# Patient Record
Sex: FEMALE | Race: WHITE | NOT HISPANIC OR LATINO | Employment: FULL TIME | URBAN - METROPOLITAN AREA
[De-identification: names, ages, dates, MRNs, and addresses within clinical notes are randomized per-mention and may not be internally consistent; named-entity substitution may affect disease eponyms.]

---

## 2017-01-30 ENCOUNTER — ANESTHESIA (OUTPATIENT)
Dept: SURGERY | Facility: HOSPITAL | Age: 42
End: 2017-01-30
Payer: MEDICAID

## 2017-01-30 ENCOUNTER — ANESTHESIA EVENT (OUTPATIENT)
Dept: SURGERY | Facility: HOSPITAL | Age: 42
End: 2017-01-30
Payer: MEDICAID

## 2017-01-30 ENCOUNTER — HOSPITAL ENCOUNTER (OUTPATIENT)
Facility: HOSPITAL | Age: 42
Discharge: HOME | End: 2017-01-30
Attending: SURGERY | Admitting: SURGERY
Payer: MEDICAID

## 2017-01-30 DIAGNOSIS — K37 APPENDICITIS, UNSPECIFIED APPENDICITIS TYPE: Primary | ICD-10-CM

## 2017-01-30 LAB
ALBUMIN SERPL BCP-MCNC: 4.2 G/DL
ALP SERPL-CCNC: 88 U/L
ALT SERPL W/O P-5'-P-CCNC: 21 U/L
AMYLASE SERPL-CCNC: 62 U/L
ANION GAP SERPL CALC-SCNC: 12 MMOL/L
AST SERPL-CCNC: 16 U/L
BACTERIA #/AREA URNS HPF: ABNORMAL /HPF
BASOPHILS # BLD AUTO: 0.02 K/UL
BASOPHILS NFR BLD: 0.1 %
BILIRUB SERPL-MCNC: 1.2 MG/DL
BILIRUB UR QL STRIP: NEGATIVE
BUN SERPL-MCNC: 12 MG/DL
CALCIUM SERPL-MCNC: 9.5 MG/DL
CHLORIDE SERPL-SCNC: 101 MMOL/L
CLARITY UR: CLEAR
CO2 SERPL-SCNC: 25 MMOL/L
COLOR UR: YELLOW
CREAT SERPL-MCNC: 0.8 MG/DL
DIFFERENTIAL METHOD: ABNORMAL
EOSINOPHIL # BLD AUTO: 0 K/UL
EOSINOPHIL NFR BLD: 0 %
ERYTHROCYTE [DISTWIDTH] IN BLOOD BY AUTOMATED COUNT: 13.5 %
EST. GFR  (AFRICAN AMERICAN): >60 ML/MIN/1.73 M^2
EST. GFR  (NON AFRICAN AMERICAN): >60 ML/MIN/1.73 M^2
GLUCOSE SERPL-MCNC: 137 MG/DL
GLUCOSE UR QL STRIP: NEGATIVE
HCT VFR BLD AUTO: 40.9 %
HGB BLD-MCNC: 13.7 G/DL
HGB UR QL STRIP: ABNORMAL
KETONES UR QL STRIP: NEGATIVE
LACTATE SERPL-SCNC: 1.8 MMOL/L
LEUKOCYTE ESTERASE UR QL STRIP: NEGATIVE
LYMPHOCYTES # BLD AUTO: 0.9 K/UL
LYMPHOCYTES NFR BLD: 4.4 %
MCH RBC QN AUTO: 29.8 PG
MCHC RBC AUTO-ENTMCNC: 33.5 %
MCV RBC AUTO: 89 FL
MICROSCOPIC COMMENT: ABNORMAL
MONOCYTES # BLD AUTO: 0.5 K/UL
MONOCYTES NFR BLD: 2.4 %
NEUTROPHILS # BLD AUTO: 18.8 K/UL
NEUTROPHILS NFR BLD: 93.1 %
NITRITE UR QL STRIP: NEGATIVE
PH UR STRIP: 7 [PH] (ref 5–8)
PLATELET # BLD AUTO: 458 K/UL
PMV BLD AUTO: 9.5 FL
POTASSIUM SERPL-SCNC: 3.8 MMOL/L
PROT SERPL-MCNC: 8.2 G/DL
PROT UR QL STRIP: NEGATIVE
RBC # BLD AUTO: 4.6 M/UL
RBC #/AREA URNS HPF: 7 /HPF (ref 0–4)
SODIUM SERPL-SCNC: 138 MMOL/L
SP GR UR STRIP: 1.01 (ref 1–1.03)
URN SPEC COLLECT METH UR: ABNORMAL
UROBILINOGEN UR STRIP-ACNC: NEGATIVE EU/DL
WBC # BLD AUTO: 20.23 K/UL
WBC #/AREA URNS HPF: 0 /HPF (ref 0–5)

## 2017-01-30 PROCEDURE — 96367 TX/PROPH/DG ADDL SEQ IV INF: CPT

## 2017-01-30 PROCEDURE — 63600175 PHARM REV CODE 636 W HCPCS

## 2017-01-30 PROCEDURE — 25000003 PHARM REV CODE 250: Performed by: NURSE ANESTHETIST, CERTIFIED REGISTERED

## 2017-01-30 PROCEDURE — 80053 COMPREHEN METABOLIC PANEL: CPT

## 2017-01-30 PROCEDURE — 87040 BLOOD CULTURE FOR BACTERIA: CPT

## 2017-01-30 PROCEDURE — 25000003 PHARM REV CODE 250: Performed by: SURGERY

## 2017-01-30 PROCEDURE — 88304 TISSUE EXAM BY PATHOLOGIST: CPT | Performed by: PATHOLOGY

## 2017-01-30 PROCEDURE — 37000008 HC ANESTHESIA 1ST 15 MINUTES: Performed by: SURGERY

## 2017-01-30 PROCEDURE — 96361 HYDRATE IV INFUSION ADD-ON: CPT

## 2017-01-30 PROCEDURE — 63600175 PHARM REV CODE 636 W HCPCS: Performed by: SURGERY

## 2017-01-30 PROCEDURE — 81000 URINALYSIS NONAUTO W/SCOPE: CPT

## 2017-01-30 PROCEDURE — 36000709 HC OR TIME LEV III EA ADD 15 MIN: Performed by: SURGERY

## 2017-01-30 PROCEDURE — 27000496 *HC LARYNGEAL BLADE (STAH ONLY): Performed by: NURSE ANESTHETIST, CERTIFIED REGISTERED

## 2017-01-30 PROCEDURE — 25500020 PHARM REV CODE 255: Performed by: SURGERY

## 2017-01-30 PROCEDURE — 83605 ASSAY OF LACTIC ACID: CPT

## 2017-01-30 PROCEDURE — 96365 THER/PROPH/DIAG IV INF INIT: CPT

## 2017-01-30 PROCEDURE — 99285 EMERGENCY DEPT VISIT HI MDM: CPT | Mod: 25

## 2017-01-30 PROCEDURE — 27000494 *HC OXYGEN SET UP (STAH ONLY): Performed by: NURSE ANESTHETIST, CERTIFIED REGISTERED

## 2017-01-30 PROCEDURE — 27201423 OPTIME MED/SURG SUP & DEVICES STERILE SUPPLY: Performed by: SURGERY

## 2017-01-30 PROCEDURE — 27200120 HC KIT IV START (RUSH ONLY): Performed by: NURSE ANESTHETIST, CERTIFIED REGISTERED

## 2017-01-30 PROCEDURE — 36000708 HC OR TIME LEV III 1ST 15 MIN: Performed by: SURGERY

## 2017-01-30 PROCEDURE — 00840 ANES IPER PX LOWER ABD NOS: CPT | Mod: P2,,QZ | Performed by: NURSE ANESTHETIST, CERTIFIED REGISTERED

## 2017-01-30 PROCEDURE — 36415 COLL VENOUS BLD VENIPUNCTURE: CPT

## 2017-01-30 PROCEDURE — 71000033 HC RECOVERY, INTIAL HOUR: Performed by: SURGERY

## 2017-01-30 PROCEDURE — 27000495 *HC ANESTHESIA START UP SUPPLY KIT (STAH ONLY): Performed by: NURSE ANESTHETIST, CERTIFIED REGISTERED

## 2017-01-30 PROCEDURE — 96375 TX/PRO/DX INJ NEW DRUG ADDON: CPT

## 2017-01-30 PROCEDURE — 85025 COMPLETE CBC W/AUTO DIFF WBC: CPT

## 2017-01-30 PROCEDURE — 37000009 HC ANESTHESIA EA ADD 15 MINS: Performed by: SURGERY

## 2017-01-30 PROCEDURE — 63600175 PHARM REV CODE 636 W HCPCS: Performed by: NURSE ANESTHETIST, CERTIFIED REGISTERED

## 2017-01-30 PROCEDURE — 82150 ASSAY OF AMYLASE: CPT

## 2017-01-30 RX ORDER — SODIUM CHLORIDE, SODIUM LACTATE, POTASSIUM CHLORIDE, CALCIUM CHLORIDE 600; 310; 30; 20 MG/100ML; MG/100ML; MG/100ML; MG/100ML
INJECTION, SOLUTION INTRAVENOUS CONTINUOUS PRN
Status: DISCONTINUED | OUTPATIENT
Start: 2017-01-30 | End: 2017-01-30

## 2017-01-30 RX ORDER — CEFOXITIN SODIUM 2 G/50ML
2 INJECTION, SOLUTION INTRAVENOUS
Status: DISCONTINUED | OUTPATIENT
Start: 2017-01-30 | End: 2017-01-30 | Stop reason: HOSPADM

## 2017-01-30 RX ORDER — SUCCINYLCHOLINE CHLORIDE 20 MG/ML
INJECTION INTRAMUSCULAR; INTRAVENOUS
Status: DISCONTINUED | OUTPATIENT
Start: 2017-01-30 | End: 2017-01-30

## 2017-01-30 RX ORDER — ONDANSETRON HYDROCHLORIDE 2 MG/ML
INJECTION, SOLUTION INTRAMUSCULAR; INTRAVENOUS
Status: DISCONTINUED | OUTPATIENT
Start: 2017-01-30 | End: 2017-01-30

## 2017-01-30 RX ORDER — LIDOCAINE HYDROCHLORIDE 20 MG/ML
INJECTION, SOLUTION EPIDURAL; INFILTRATION; INTRACAUDAL; PERINEURAL
Status: DISCONTINUED | OUTPATIENT
Start: 2017-01-30 | End: 2017-01-30

## 2017-01-30 RX ORDER — BUSPIRONE HYDROCHLORIDE 10 MG/1
10 TABLET ORAL 3 TIMES DAILY
Status: DISCONTINUED | OUTPATIENT
Start: 2017-01-30 | End: 2017-01-30 | Stop reason: HOSPADM

## 2017-01-30 RX ORDER — TRIAMTERENE AND HYDROCHLOROTHIAZIDE 37.5; 25 MG/1; MG/1
1 CAPSULE ORAL EVERY MORNING
Status: DISCONTINUED | OUTPATIENT
Start: 2017-01-30 | End: 2017-01-30 | Stop reason: HOSPADM

## 2017-01-30 RX ORDER — SODIUM CHLORIDE 9 MG/ML
INJECTION, SOLUTION INTRAVENOUS CONTINUOUS
Status: DISCONTINUED | OUTPATIENT
Start: 2017-01-30 | End: 2017-01-30 | Stop reason: HOSPADM

## 2017-01-30 RX ORDER — ROCURONIUM BROMIDE 10 MG/ML
INJECTION, SOLUTION INTRAVENOUS
Status: DISCONTINUED | OUTPATIENT
Start: 2017-01-30 | End: 2017-01-30

## 2017-01-30 RX ORDER — ACETAMINOPHEN 325 MG/1
650 TABLET ORAL EVERY 8 HOURS PRN
Status: DISCONTINUED | OUTPATIENT
Start: 2017-01-30 | End: 2017-01-30

## 2017-01-30 RX ORDER — MIDAZOLAM HYDROCHLORIDE 1 MG/ML
INJECTION INTRAMUSCULAR; INTRAVENOUS
Status: DISCONTINUED | OUTPATIENT
Start: 2017-01-30 | End: 2017-01-30

## 2017-01-30 RX ORDER — HYDROCODONE BITARTRATE AND ACETAMINOPHEN 5; 325 MG/1; MG/1
1 TABLET ORAL EVERY 6 HOURS PRN
Qty: 20 TABLET | Refills: 0 | Status: SHIPPED | OUTPATIENT
Start: 2017-01-30 | End: 2017-10-11

## 2017-01-30 RX ORDER — GLYCOPYRROLATE 0.2 MG/ML
INJECTION INTRAMUSCULAR; INTRAVENOUS
Status: DISCONTINUED | OUTPATIENT
Start: 2017-01-30 | End: 2017-01-30

## 2017-01-30 RX ORDER — PROPOFOL 10 MG/ML
VIAL (ML) INTRAVENOUS
Status: DISCONTINUED | OUTPATIENT
Start: 2017-01-30 | End: 2017-01-30

## 2017-01-30 RX ORDER — FENTANYL CITRATE 50 UG/ML
INJECTION, SOLUTION INTRAMUSCULAR; INTRAVENOUS
Status: DISCONTINUED | OUTPATIENT
Start: 2017-01-30 | End: 2017-01-30

## 2017-01-30 RX ORDER — DEXAMETHASONE SODIUM PHOSPHATE 4 MG/ML
INJECTION, SOLUTION INTRA-ARTICULAR; INTRALESIONAL; INTRAMUSCULAR; INTRAVENOUS; SOFT TISSUE
Status: DISCONTINUED | OUTPATIENT
Start: 2017-01-30 | End: 2017-01-30

## 2017-01-30 RX ORDER — ONDANSETRON 2 MG/ML
4 INJECTION INTRAMUSCULAR; INTRAVENOUS EVERY 8 HOURS PRN
Status: DISCONTINUED | OUTPATIENT
Start: 2017-01-30 | End: 2017-01-30 | Stop reason: HOSPADM

## 2017-01-30 RX ORDER — CEFOXITIN SODIUM 2 G/50ML
2 INJECTION, SOLUTION INTRAVENOUS
Status: DISCONTINUED | OUTPATIENT
Start: 2017-01-30 | End: 2017-01-30

## 2017-01-30 RX ORDER — MORPHINE SULFATE 4 MG/ML
4 INJECTION, SOLUTION INTRAMUSCULAR; INTRAVENOUS EVERY 4 HOURS PRN
Status: DISCONTINUED | OUTPATIENT
Start: 2017-01-30 | End: 2017-01-30 | Stop reason: HOSPADM

## 2017-01-30 RX ORDER — ACETAMINOPHEN 325 MG/1
650 TABLET ORAL EVERY 6 HOURS PRN
Status: DISCONTINUED | OUTPATIENT
Start: 2017-01-30 | End: 2017-01-30 | Stop reason: HOSPADM

## 2017-01-30 RX ORDER — HYDROCODONE BITARTRATE AND ACETAMINOPHEN 5; 325 MG/1; MG/1
1 TABLET ORAL EVERY 4 HOURS PRN
Status: DISCONTINUED | OUTPATIENT
Start: 2017-01-30 | End: 2017-01-30 | Stop reason: HOSPADM

## 2017-01-30 RX ORDER — KETOROLAC TROMETHAMINE 30 MG/ML
INJECTION, SOLUTION INTRAMUSCULAR; INTRAVENOUS
Status: DISCONTINUED | OUTPATIENT
Start: 2017-01-30 | End: 2017-01-30

## 2017-01-30 RX ORDER — HYDROMORPHONE HYDROCHLORIDE 2 MG/ML
2 INJECTION, SOLUTION INTRAMUSCULAR; INTRAVENOUS; SUBCUTANEOUS EVERY 6 HOURS PRN
Status: DISCONTINUED | OUTPATIENT
Start: 2017-01-30 | End: 2017-01-30 | Stop reason: ALTCHOICE

## 2017-01-30 RX ORDER — BUPIVACAINE HYDROCHLORIDE 2.5 MG/ML
INJECTION, SOLUTION INFILTRATION; PERINEURAL
Status: DISCONTINUED | OUTPATIENT
Start: 2017-01-30 | End: 2017-01-30 | Stop reason: HOSPADM

## 2017-01-30 RX ORDER — HYDROMORPHONE HYDROCHLORIDE 1 MG/ML
1 INJECTION, SOLUTION INTRAMUSCULAR; INTRAVENOUS; SUBCUTANEOUS
Status: COMPLETED | OUTPATIENT
Start: 2017-01-30 | End: 2017-01-30

## 2017-01-30 RX ORDER — SODIUM CHLORIDE 9 MG/ML
1000 INJECTION, SOLUTION INTRAVENOUS
Status: COMPLETED | OUTPATIENT
Start: 2017-01-30 | End: 2017-01-30

## 2017-01-30 RX ORDER — HYDROMORPHONE HYDROCHLORIDE 1 MG/ML
1 INJECTION, SOLUTION INTRAMUSCULAR; INTRAVENOUS; SUBCUTANEOUS EVERY 4 HOURS PRN
Status: DISCONTINUED | OUTPATIENT
Start: 2017-01-30 | End: 2017-01-30 | Stop reason: SDUPTHER

## 2017-01-30 RX ORDER — PANTOPRAZOLE SODIUM 40 MG/1
40 TABLET, DELAYED RELEASE ORAL DAILY
Status: DISCONTINUED | OUTPATIENT
Start: 2017-01-30 | End: 2017-01-30 | Stop reason: HOSPADM

## 2017-01-30 RX ORDER — AMLODIPINE BESYLATE 5 MG/1
5 TABLET ORAL DAILY
Status: DISCONTINUED | OUTPATIENT
Start: 2017-01-30 | End: 2017-01-30 | Stop reason: HOSPADM

## 2017-01-30 RX ADMIN — IOHEXOL 100 ML: 350 INJECTION, SOLUTION INTRAVENOUS at 11:01

## 2017-01-30 RX ADMIN — SODIUM CHLORIDE 1000 ML: 0.9 INJECTION, SOLUTION INTRAVENOUS at 09:01

## 2017-01-30 RX ADMIN — SUCCINYLCHOLINE CHLORIDE 120 MG: 20 INJECTION, SOLUTION INTRAMUSCULAR; INTRAVENOUS at 03:01

## 2017-01-30 RX ADMIN — HYDROMORPHONE HYDROCHLORIDE 1 MG: 1 INJECTION, SOLUTION INTRAMUSCULAR; INTRAVENOUS; SUBCUTANEOUS at 12:01

## 2017-01-30 RX ADMIN — MIDAZOLAM HYDROCHLORIDE 2 MG: 1 INJECTION, SOLUTION INTRAMUSCULAR; INTRAVENOUS at 03:01

## 2017-01-30 RX ADMIN — KETOROLAC TROMETHAMINE 30 MG: 30 INJECTION, SOLUTION INTRAMUSCULAR; INTRAVENOUS at 04:01

## 2017-01-30 RX ADMIN — HYDROCODONE BITARTRATE AND ACETAMINOPHEN 1 TABLET: 5; 325 TABLET ORAL at 06:01

## 2017-01-30 RX ADMIN — SODIUM CHLORIDE, SODIUM LACTATE, POTASSIUM CHLORIDE, AND CALCIUM CHLORIDE: .6; .31; .03; .02 INJECTION, SOLUTION INTRAVENOUS at 03:01

## 2017-01-30 RX ADMIN — ROCURONIUM BROMIDE 25 MG: 10 INJECTION, SOLUTION INTRAVENOUS at 03:01

## 2017-01-30 RX ADMIN — GLYCOPYRROLATE 0.4 MG: 0.2 INJECTION INTRAMUSCULAR; INTRAVENOUS at 04:01

## 2017-01-30 RX ADMIN — CEFOXITIN SODIUM 2 G: 2 INJECTION, SOLUTION INTRAVENOUS at 11:01

## 2017-01-30 RX ADMIN — DEXAMETHASONE SODIUM PHOSPHATE 8 MG: 4 INJECTION, SOLUTION INTRAMUSCULAR; INTRAVENOUS at 03:01

## 2017-01-30 RX ADMIN — IOHEXOL 30 ML: 350 INJECTION, SOLUTION INTRAVENOUS at 09:01

## 2017-01-30 RX ADMIN — ROCURONIUM BROMIDE 5 MG: 10 INJECTION, SOLUTION INTRAVENOUS at 03:01

## 2017-01-30 RX ADMIN — FENTANYL CITRATE 150 MCG: 50 INJECTION, SOLUTION INTRAMUSCULAR; INTRAVENOUS at 03:01

## 2017-01-30 RX ADMIN — ONDANSETRON 4 MG: 2 INJECTION, SOLUTION INTRAMUSCULAR; INTRAVENOUS at 04:01

## 2017-01-30 RX ADMIN — HYDROMORPHONE HYDROCHLORIDE 1 MG: 1 INJECTION, SOLUTION INTRAMUSCULAR; INTRAVENOUS; SUBCUTANEOUS at 09:01

## 2017-01-30 RX ADMIN — LIDOCAINE HYDROCHLORIDE 60 MG: 20 INJECTION, SOLUTION EPIDURAL; INFILTRATION; INTRACAUDAL; PERINEURAL at 03:01

## 2017-01-30 RX ADMIN — PROMETHAZINE HYDROCHLORIDE 12.5 MG: 25 INJECTION INTRAMUSCULAR; INTRAVENOUS at 09:01

## 2017-01-30 RX ADMIN — PROPOFOL 180 MG: 10 INJECTION, EMULSION INTRAVENOUS at 03:01

## 2017-01-30 RX ADMIN — FENTANYL CITRATE 50 MCG: 50 INJECTION, SOLUTION INTRAMUSCULAR; INTRAVENOUS at 04:01

## 2017-01-30 RX ADMIN — SODIUM CHLORIDE: 0.9 INJECTION, SOLUTION INTRAVENOUS at 12:01

## 2017-01-30 NOTE — BRIEF OP NOTE
[facility]  Brief Operative Note     SUMMARY     Surgery Date: 1/30/2017     Surgeon(s) and Role:     * Gilmer Hancock MD - Primary    Assisting Surgeon: None    Pre-op Diagnosis:  Acute appendicitis [K35.80]    Post-op Diagnosis:  Post-Op Diagnosis Codes:     * Acute appendicitis [K35.80]    Procedure(s) (LRB):  APPENDECTOMY-LAPAROSCOPIC (N/A)    Anesthesia: General    Description of the findings of the procedure:     Findings/Key Components:     Estimated Blood Loss: 30 mL         Specimens:   Specimen     None          Discharge Note    SUMMARY     Admit Date: 1/30/2017    Discharge Date and Time: 1/30/2017 4:32 PM    Hospital Course (synopsis of major diagnoses, care, treatment, and services provided during the course of the hospital stay):      Final Diagnosis: Post-Op Diagnosis Codes:     * Acute appendicitis [K35.80]    Disposition: Home or Self Care    Follow Up/Patient Instructions:     Medications:  Reconciled Home Medications:   Current Discharge Medication List      CONTINUE these medications which have NOT CHANGED    Details   alprazolam (XANAX) 0.5 MG tablet Take 1 tablet (0.5 mg total) by mouth 3 (three) times daily as needed for Anxiety.  Qty: 90 tablet, Refills: 2      amlodipine (NORVASC) 5 MG tablet Take 1 tablet (5 mg total) by mouth once daily.  Qty: 30 tablet, Refills: 3    Associated Diagnoses: Essential hypertension      busPIRone (BUSPAR) 10 MG tablet Take 1 tablet (10 mg total) by mouth 3 (three) times daily.  Qty: 90 tablet, Refills: 3    Associated Diagnoses: Anxiety      estrogens, conjugated, (PREMARIN) 0.625 MG tablet Take 1 tablet (0.625 mg total) by mouth once daily.  Qty: 30 tablet, Refills: 11      triamterene-hydrochlorothiazide 37.5-25 mg (DYAZIDE) 37.5-25 mg per capsule Take 1 capsule by mouth every morning.  Qty: 30 capsule, Refills: 11           No discharge procedures on file.

## 2017-01-30 NOTE — ED PROVIDER NOTES
[facility]                                        2017                     Chief Complaint  42 y.o. female with Abdominal Pain    History of Present Illness  Riddhi Frederick presents to the emergency room with RLQ pain for 8 hours  Patient has had sharp right left lower quadrant pain, worse this morning  Patient states the pain is severe, could not eat, 10/10 intensity noted  Patient denies any nausea vomiting or diarrhea, no fever or weight loss  Has a past surgical history significant for CBD stones/cholecystectomy  Patient on CT he has an uncomplicated appendicitis without perforation  General surgery Brayan and Santi immediately called for consultation    The history is provided by the patient    Past Medical History   -- Anxiety    -- Hypertension    -- Obesity, Class II, BMI 35-39.9    -- Panic attacks    -- Uterine leiomyoma      Past Surgical History   --  section     -- Knee cartilage surgery     -- Tubal ligation     -- Cholecystectomy     -- Ercp     -- Hysterectomy        ALLERGIES: ACE inhibitor     Review of Systems and Physical Exam     Review of Systems  -- Constitution - no fever, denies fatigue, no weakness, no chills  -- Eyes - no tearing or redness, no visual disturbance  -- Ear, Nose - no tinnitus or earache, no nasal congestion or discharge  -- Mouth,Throat - no sore throat, no toothache, normal voice, normal swallowing  -- Respiratory - denies cough and congestion, no shortness of breath, no CALDERON  -- Cardiovascular - denies chest pain, no palpitations, denies claudication  -- Gastrointestinal - RLQ abdominal pain, no nausea, vomiting, or diarrhea  -- Genitourinary - no dysuria, no denies flank pain, no hematuria or frequency   -- Musculoskeletal - denies back pain, negative for myalgias and arthralgias   -- Neurological - no headache, denies weakness or seizure; no LOC  -- Skin - denies pallor, rash, or changes in skin. no hives or welts noted    Vital  Signs  -- Her blood pressure is 142/86 (abnormal) and her pulse is 67.   -- Her respiration is 20 and oxygen saturation is 99%.      Physical Exam  -- Nursing note and vitals reviewed  -- Constitutional: Appears well-developed and well-nourished  -- Head: Atraumatic. Normocephalic. No obvious abnormality  -- Eyes: Pupils are equal and reactive to light. Normal conjunctiva and lids  -- Neck: Normal range of motion. Neck supple. No masses, trachea midline  -- Cardiac: Normal rate, regular rhythm and normal heart sounds  -- Pulmonary: Normal respiratory effort, breath sounds clear to auscultation  -- Abdominal: RLQ tenderness. Normal bowel sounds. Normal liver edge  -- Musculoskeletal: Normal range of motion, no effusions. Joints stable     Emergency Room Course     Treatment and Evaluation  -- CT abdomen and pelvis shows acute appendicitis  -- White blood cell count is 20,000 white count  -- The electrolytes drawn in the ER today were within normal limits  -- Blood cultures have also been drawn, results are pending   -- Lactic Acid drawn in the ER today was normal    Radiology    Medications Given  -- ceFOXItin 2 g/50ml Dextrose IVPB (not administered)   -- 0.9%  NaCl infusion (1,000 mLs Intravenous New Bag 1/30/17 0943)   -- hydromorphone (PF) injection 1 mg (1 mg Intravenous Given 1/30/17 0945)   -- promethazine (PHENERGAN) 12.5 mg in dextrose 5 % 50 mL IVPB    -- omnipaque 350 iohexol 30 mL (30 mLs Oral Given 1/30/17 0930)   -- omnipaque 350 iohexol 100 mL (100 mLs Intravenous Given 1/30/17 1105)     Diagnosis  -- The encounter diagnosis was Appendicitis, unspecified appendicitis type.    Disposition and Plan  -- Disposition: observation  -- Condition: stable  -- Telemetry monitoring  -- Morning labs  -- SCD hoses  -- Home medications  -- Nausea medication when necessary  -- Pain medication when necessary  -- NPO  -- Intravenous fluids  -- Routine monitoring  -- Bed rest until otherwise stated  -- IV  antibiotics  -- Blood cultures pending    This note is dictated on Dragon Natural Speaking word recognition program.  There are word recognition mistakes that are occasionally missed on review.           Alcides Sparks MD  01/30/17 5528

## 2017-01-30 NOTE — IP AVS SNAPSHOT
[address] [address]  Phone: [phone]           Patient Discharge Instructions     Our goal is to set you up for success. This packet includes information on your condition, medications, and your home care. It will help you to care for yourself so you don't get sicker and need to go back to the hospital.     Please ask your nurse if you have any questions.        There are many details to remember when preparing to leave the hospital. Here is what you will need to do:    1. Take your medicine. If you are prescribed medications, review your Medication List in the following pages. You may have new medications to  at the pharmacy and others that you'll need to stop taking. Review the instructions for how and when to take your medications. Talk with your doctor or nurses if you are unsure of what to do.     2. Go to your follow-up appointments. Specific follow-up information is listed in the following pages. Your may be contacted by a transition nurse or clinical provider about future appointments. Be sure we have all of the phone numbers to reach you, if needed. Please contact your provider's office if you are unable to make an appointment.     3. Watch for warning signs. Your doctor or nurse will give you detailed warning signs to watch for and when to call for assistance. These instructions may also include educational information about your condition. If you experience any of warning signs to your health, call your doctor.               ** Verify the list of medication(s) below is accurate and up to date. Carry this with you in case of emergency. If your medications have changed, please notify your healthcare provider.             Medication List      START taking these medications        Additional Info                      hydrocodone-acetaminophen 5-325mg 5-325 mg per tablet   Commonly known as:  NORCO   Quantity:  20 tablet   Refills:  0   Dose:  1  tablet    Instructions:  Take 1 tablet by mouth every 6 (six) hours as needed for Pain.     Begin Date    AM    Noon    PM    Bedtime         CONTINUE taking these medications        Additional Info                      alprazolam 0.5 MG tablet   Commonly known as:  XANAX   Quantity:  90 tablet   Refills:  2   Dose:  0.5 mg    Instructions:  Take 1 tablet (0.5 mg total) by mouth 3 (three) times daily as needed for Anxiety.     Begin Date    AM    Noon    PM    Bedtime       amlodipine 5 MG tablet   Commonly known as:  NORVASC   Quantity:  30 tablet   Refills:  3   Dose:  5 mg    Instructions:  Take 1 tablet (5 mg total) by mouth once daily.     Begin Date    AM    Noon    PM    Bedtime       busPIRone 10 MG tablet   Commonly known as:  BUSPAR   Quantity:  90 tablet   Refills:  3   Dose:  10 mg    Instructions:  Take 1 tablet (10 mg total) by mouth 3 (three) times daily.     Begin Date    AM    Noon    PM    Bedtime       estrogens (conjugated) 0.625 MG tablet   Commonly known as:  PREMARIN   Quantity:  30 tablet   Refills:  11   Dose:  0.625 mg    Instructions:  Take 1 tablet (0.625 mg total) by mouth once daily.     Begin Date    AM    Noon    PM    Bedtime       triamterene-hydrochlorothiazide 37.5-25 mg 37.5-25 mg per capsule   Commonly known as:  DYAZIDE   Quantity:  30 capsule   Refills:  11   Dose:  1 capsule    Instructions:  Take 1 capsule by mouth every morning.     Begin Date    AM    Noon    PM    Bedtime            Where to Get Your Medications      You can get these medications from any pharmacy     Bring a paper prescription for each of these medications     hydrocodone-acetaminophen 5-325mg 5-325 mg per tablet                  Please bring to all follow up appointments:    1. A copy of your discharge instructions.  2. All medicines you are currently taking in their original bottles.  3. Identification and insurance card.    Please arrive 15 minutes ahead of scheduled appointment time.    Please call  24 hours in advance if you must reschedule your appointment and/or time.        Your Scheduled Appointments     Mar 30, 2017  1:00 PM CDT   Established Patient Visit with MD LLUVIA Beltrán Jr. - [facility] ([facility]    [address] [place] [address]  Tomas TEN[place]IO [id number] [phone]              Follow-up Information     Follow up with Gilmer Hancock MD In 1 week.    Specialty:  General Surgery    Why:  call tomorrow for appt next week     Contact information:    [address] [facility] [address] [phone]          Discharge Instructions     Future Orders    Activity as tolerated     Call MD for:  difficulty breathing, headache or visual disturbances     Call MD for:  persistent nausea and vomiting     Call MD for:  redness, tenderness, or signs of infection (pain, swelling, redness, odor or green/yellow discharge around incision site)     Call MD for:  severe uncontrolled pain     Call MD for:  temperature >100.4     Diet general     Questions:    Total calories:      Fat restriction, if any:      Protein restriction, if any:      Na restriction, if any:      Fluid restriction:      Additional restrictions:      Shower on day dressing removed (No bath)         Discharge Instructions         Discharge Instructions for Laparoscopic Appendectomy (Appendix Removal)  You have had a procedure known as laparoscopic appendectomy to remove your appendix. The appendix is a worm-shaped hollow pouch attached to your large intestine. During your procedure, the doctor made two to four small incisions. One was near your bellybutton, and the others were elsewhere on your abdomen. Through one incision, the doctor inserted a thin tube with a camera attached (called a laparoscope). Surgical tools were inserted in the other incisions.  While you recover you may have discomfort in your shoulder and chest for up to 48 hours after surgery. This is common. It is caused by carbon dioxide (gas) used  during the operation. It will go away.   Activity  · Resume light activities around your home as soon as possible.  · Dont lift anything heavier than 10 pounds until your doctor says its OK.  · Limit sports and strenuous activities for 1 or 2 weeks.  · Shower as usual:  ¨ Gently wash around your incisions with soap and water.  ¨ Dont bathe or soak in a tub until your incisions are well healed.  · Wear loose-fitting clothes. This will help you be more comfortable and cause less irritation around your incisions.  · Dont drive until you are no longer taking prescription pain medication.  Diet  · Eat a bland, low-fat diet, such as the following:  ¨ Well-cooked soft cereals  ¨ Mashed potatoes  ¨ Plain toast or bread, crackers  ¨ Plain spaghetti  ¨ Rice  ¨ Macaroni (plain or with cheese)  ¨ Cottage cheese  ¨ Puddings  ¨ Low-fat yogurt  ¨ Low-fat milk  ¨ Ripe bananas  · Drink 6 to 8 glasses of water a day, unless directed otherwise.  · If you are constipated, take a fiber laxative such as Metamucil.  When to Call Your Doctor  Call your doctor immediately if you have any of the following:  · Swelling, oozing, worsening pain, or unusual redness around the incision  · Fever of 101.5°F (38.5°C) or higher  · Increasing abdominal pain  · Severe diarrhea, bloating, or constipation  · Nausea or vomiting  © 4258-3234 [employer]. [address] [place] [address]. All rights reserved. This information is not intended as a substitute for professional medical care. Always follow your healthcare professional's instructions.            Primary Diagnosis     Your primary diagnosis was:  Appendicitis      Admission Information     Date & Time Provider Department CSN    1/30/2017  7:59 AM Gilmer Hancock MD [facility] [id number]      Care Providers     Provider Role Specialty Primary office phone    Gilmer Hancock MD Attending Provider General Surgery [phone]    Gilmer Hancock MD Surgeon   "General Surgery [phone]      Your Vitals Were     BP Pulse Temp Resp Height Weight    136/73 (BP Location: Left arm, Patient Position: Lying, BP Method: Automatic) 84 98.2 °F (36.8 °C) (Oral) 16 5' 5" (1.651 m) 89.4 kg (197 lb)    Last Period SpO2 BMI          07/26/2016 98% 32.78 kg/m2        Recent Lab Values        4/18/2016                          12:02 PM           A1C 5.3                       Pending Labs     Order Current Status    Specimen to Pathology - Surgery Collected (01/30/17 3704)    Blood culture In process    Blood culture In process      Allergies as of 1/30/2017        Reactions    Ace Inhibitors Other (See Comments)    Blood pressure dropped too low      [facility]     [facility] Nurse Care Line - 24/7 Assistance  Unless otherwise directed by your provider, please contact [facility], our nurse care line that is available for 24/7 assistance.     Registered nurses in the [facility] provide clinical advisement, health education, appointment booking, and other advisory services.  Call for this free service at [phone].        Advance Directives     An advance directive is a document which, in the event you are no longer able to make decisions for yourself, tells your healthcare team what kind of treatment you do or do not want to receive, or who you would like to make those decisions for you.  If you do not currently have an advance directive, [facility] encourages you to create one.  For more information call:  [phone] ([phone]), [phone] ([phone]),  or log on to [email or web address].        Smoking Cessation     If you would like to quit smoking:   You may be eligible for free services if you are a [place] resident and started smoking cigarettes before September 1, 1988.  Call the Smoking Cessation [employer] (SCT) toll free at (758) 573-6581 or (719) 772-7673.   Call 0-188-QUIT-NOW if you do not meet the above criteria.          "   Language Assistance Services     ATTENTION: Language assistance services are available, free of charge. Please call [phone].      ATENCIÓN: Si habla walt, tiene a king disposición servicios gratuitos de asistencia lingüística. Llame al [phone].     CHÚ Ý: N?u b?n nói Ti?ng Vi?t, có các d?ch v? h? tr? ngôn ng? mi?n phí dành cho b?n. G?i s? [phone].         [facility] complies with applicable Federal civil rights laws and does not discriminate on the basis of race, color, national origin, age, disability, or sex.

## 2017-01-30 NOTE — DISCHARGE INSTRUCTIONS
Discharge Instructions for Laparoscopic Appendectomy (Appendix Removal)  You have had a procedure known as laparoscopic appendectomy to remove your appendix. The appendix is a worm-shaped hollow pouch attached to your large intestine. During your procedure, the doctor made two to four small incisions. One was near your bellybutton, and the others were elsewhere on your abdomen. Through one incision, the doctor inserted a thin tube with a camera attached (called a laparoscope). Surgical tools were inserted in the other incisions.  While you recover you may have discomfort in your shoulder and chest for up to 48 hours after surgery. This is common. It is caused by carbon dioxide (gas) used during the operation. It will go away.   Activity  · Resume light activities around your home as soon as possible.  · Dont lift anything heavier than 10 pounds until your doctor says its OK.  · Limit sports and strenuous activities for 1 or 2 weeks.  · Shower as usual:  ¨ Gently wash around your incisions with soap and water.  ¨ Dont bathe or soak in a tub until your incisions are well healed.  · Wear loose-fitting clothes. This will help you be more comfortable and cause less irritation around your incisions.  · Dont drive until you are no longer taking prescription pain medication.  Diet  · Eat a bland, low-fat diet, such as the following:  ¨ Well-cooked soft cereals  ¨ Mashed potatoes  ¨ Plain toast or bread, crackers  ¨ Plain spaghetti  ¨ Rice  ¨ Macaroni (plain or with cheese)  ¨ Cottage cheese  ¨ Puddings  ¨ Low-fat yogurt  ¨ Low-fat milk  ¨ Ripe bananas  · Drink 6 to 8 glasses of water a day, unless directed otherwise.  · If you are constipated, take a fiber laxative such as Metamucil.  When to Call Your Doctor  Call your doctor immediately if you have any of the following:  · Swelling, oozing, worsening pain, or unusual redness around the incision  · Fever of 101.5°F (38.5°C) or higher  · Increasing abdominal  pain  · Severe diarrhea, bloating, or constipation  · Nausea or vomiting  © 8493-5876 [employer]. [address] [place] [address], [place] [address]. All rights reserved. This information is not intended as a substitute for professional medical care. Always follow your healthcare professional's instructions.

## 2017-01-30 NOTE — ED TRIAGE NOTES
Patient presents to the ER with RLQ pain which started yesterday.  Patient reports symptoms worsening during the night.

## 2017-01-30 NOTE — TRANSFER OF CARE
"Anesthesia Transfer of Care Note    Patient: Riddhi Frederick    Procedure(s) Performed: Procedure(s) (LRB):  APPENDECTOMY-LAPAROSCOPIC (N/A)    Patient location: PACU    Anesthesia Type: general    Transport from OR: Transported from OR on room air with adequate spontaneous ventilation    Post pain: adequate analgesia    Post assessment: no apparent anesthetic complications and tolerated procedure well    Post vital signs: stable    Level of consciousness: awake, alert and oriented    Nausea/Vomiting: no nausea/vomiting    Complications: none          Last vitals:   Visit Vitals    BP (!) 164/91 (BP Location: Left arm, Patient Position: Lying, BP Method: Automatic)    Pulse 77    Temp 36.2 °C (97.1 °F) (Oral)    Resp 18    Ht 5' 5" (1.651 m)    Wt 89.4 kg (197 lb)    LMP 07/26/2016    SpO2 100%    Breastfeeding No    BMI 32.78 kg/m2     "

## 2017-01-30 NOTE — ANESTHESIA POSTPROCEDURE EVALUATION
"Anesthesia Post Evaluation    Patient: Riddhi Frederick    Procedure(s) Performed: Procedure(s) (LRB):  APPENDECTOMY-LAPAROSCOPIC (N/A)    Final Anesthesia Type: general  Patient location during evaluation: PACU  Patient participation: Yes- Able to Participate  Level of consciousness: awake and alert, oriented and awake  Post-procedure vital signs: reviewed and stable  Pain management: adequate  Airway patency: patent  PONV status at discharge: No PONV  Anesthetic complications: no      Cardiovascular status: blood pressure returned to baseline  Respiratory status: unassisted, spontaneous ventilation and room air  Hydration status: euvolemic  Follow-up not needed.  Comments: NAAC        Visit Vitals    /77    Pulse 82    Temp 36 °C (96.8 °F) (Tympanic)    Resp 16    Ht 5' 5" (1.651 m)    Wt 89.4 kg (197 lb)    LMP 07/26/2016    SpO2 98%    Breastfeeding No    BMI 32.78 kg/m2       Pain/Paula Score: Pain Assessment Performed: Yes (1/30/2017  5:00 PM)  Presence of Pain: complains of pain/discomfort (1/30/2017  5:00 PM)  Pain Rating Prior to Med Admin: 5 (1/30/2017  3:16 PM)  Pain Rating Post Med Admin: 3 (1/30/2017  1:02 PM)  Paula Score: 10 (1/30/2017  5:18 PM)      "

## 2017-01-30 NOTE — ANESTHESIA PREPROCEDURE EVALUATION
01/30/2017  Riddhi Frederick is a 42 y.o., female.    [facility] Pre-op Assessment    I have reviewed the Patient Summary Reports.    I have reviewed the Nursing Notes.   I have reviewed the Medications.     Review of Systems  Anesthesia Hx:  No problems with previous Anesthesia Denies Hx of Anesthetic complications  History of prior surgery of interest to airway management or planning: Previous anesthesia: General 01/28/2016 - lap meme with general anesthesia.  Procedure performed at an [facility]. Airway issues documented on chart review include GETA, mask, easy, easy direct laryngoscopy , view on direct laryngoscopy Grade I  Denies Family Hx of Anesthesia complications.   Denies Personal Hx of Anesthesia complications.   Social:  Alcohol Use Former smoker, 15 pack years  Quit one year ago Tobacco Use: Former smoker, quit smoking within the year   Hematology/Oncology:  Hematology Normal   Oncology Normal     EENT/Dental:EENT/Dental Normal   Cardiovascular:   Exercise tolerance: good Hypertension, well controlled Denies CAD.        ECG has been reviewed.    Pulmonary:  Pulmonary Normal    Renal/:  Renal/ Normal     Hepatic/GI:   Bowel Prep.    Musculoskeletal:  Musculoskeletal Normal    OB/GYN/PEDS:  Preg test neg.  AUB & pain   Neurological:  Neurology Normal    Endocrine:  Endocrine Normal    Dermatological:  Skin Normal    Psych:   anxiety          Physical Exam  General:  Obesity    Airway/Jaw/Neck:  Airway Findings: Mouth Opening: Normal Tongue: Normal  General Airway Assessment: Adult  Mallampati: II  Improves to I with phonation.  TM Distance: Normal, at least 6 cm        Eyes/Ears/Nose:  EYES/EARS/NOSE FINDINGS: Normal   Dental:  DENTAL FINDINGS: Normal   Chest/Lungs:  Chest/Lungs Clear    Heart/Vascular:  Heart Findings: Normal Heart murmur: negative Vascular Findings: Normal    Abdomen:  Abdomen  Findings: Normal    Musculoskeletal:  Musculoskeletal Findings: Normal   Skin:  Skin Findings: Normal    Mental Status:  Mental Status Findings:  Cooperative, Alert and Oriented         Anesthesia Plan  Type of Anesthesia, risks & benefits discussed:  Anesthesia Type:  general  Patient's Preference:   Intra-op Monitoring Plan:   Intra-op Monitoring Plan Comments:   Post Op Pain Control Plan:   Post Op Pain Control Plan Comments:   Induction:   IV  Beta Blocker:  Patient is not currently on a Beta-Blocker (No further documentation required).       Informed Consent: Patient understands risks and agrees with Anesthesia plan.  Questions answered. Anesthesia consent signed with patient.  ASA Score: 2     Day of Surgery Review of History & Physical: I have interviewed and examined the patient. I have reviewed the patient's H&P dated: 1/30/17. There are no significant changes.  H&P update referred to the surgeon.

## 2017-01-30 NOTE — H&P
HISTORY OF PRESENT ILLNESS:  The patient is a 42-year-old female who presented   to the Emergency Department with a 1-day history of abdominal pain.  The patient   states the pain is located in the right lower quadrant and radiated across the   lower abdomen.  She reports some nausea but no vomiting.  She reports a   subjective fever, but no chills.  The patient denies any dysuria, hematuria,   loose stools or diarrhea.    PAST MEDICAL HISTORY:  High blood pressure and obesity.    PAST SURGICAL HISTORY:  Laparoscopic hysterectomy, oophorectomy, laparoscopic   cholecystectomy, ERCP.    ALLERGIES:  ACE INHIBITOR.    MEDICATIONS:  Xanax, Norvasc, and BuSpar, Premarin.    PHYSICAL EXAMINATION:  VITAL SIGNS:  The patient's temperature is 97.1, blood pressure 164/91, pulse is   77.  GENERAL:  The patient is obese.  She is lying in bed.  She is in no acute   distress.  CHEST:  Clear.  HEART:  Regular.  ABDOMEN:  Obese.  It is soft.  She has focal tenderness in the right lower   quadrant.  She has small incisions consistent with previous laparoscopic   surgery.  EXTREMITIES:  There is no edema.  BACK:  No CVA tenderness.  NEUROLOGICAL:  There are no gross deficits.    LABORATORY DATA:  Her white count is 20, her hemoglobin is 14, hematocrit is 41,   platelet count is 458.  Sodium 138, potassium 3.8, chloride 101, CO2 of 25, BUN   12, creatinine 0.8, glucose is 137, calcium is 9.5, albumin is 4.2.  Urinalysis   is negative.  Blood culture is pending.  CT scan of the abdomen and pelvis was   performed.  The appendix is dilated measuring 1.2 cm with surrounding   periappendiceal inflammatory stranding consistent with acute appendicitis.    ASSESSMENT AND PLAN:  A 42-year-old female with acute appendicitis.  The patient   will be admitted.  We will start IV fluids and IV antibiotics.  We will proceed   with laparoscopic appendectomy.  The goals and risks of this procedure were   discussed in detail with the patient.  She  understands and wishes to proceed.      BM/HN  dd: 01/30/2017 12:34:45 (CST)  td: 01/30/2017 13:16:27 (CST)  Doc ID   #[id number]  Job ID #[id number]    CC:

## 2017-01-31 NOTE — OP NOTE
DATE OF PROCEDURE:  01/30/2017.    PREOPERATIVE DIAGNOSIS:  Acute appendicitis.    POSTOPERATIVE DIAGNOSIS:  Acute appendicitis.    PROCEDURE:  Laparoscopic appendectomy.    PHYSICIAN:  Gilmer Hancock M.D.    INDICATION FOR PROCEDURE:  The patient is a 42-year-old female who presented to   the Emergency Department with lower abdominal pain.  The patient underwent   evaluation and was diagnosed with acute appendicitis.    FINDINGS AT OPERATION:  The patient had a gangrenous appearing appendix.  There   was no evidence of perforation or abscess.  The appendix was adherent to the   lateral lower anterior abdominal wall and upper pelvis.  The base of the   appendix was viable.    DESCRIPTION OF PROCEDURE:  The patient was brought to the Operating Room and   placed on the operating table in supine position.  Adequate anesthesia was   administered.  The patient was prepped and draped in usual sterile fashion.  An   infraumbilical incision was made.  The abdominal wall was retracted anteriorly   with towel clips.  Veress needle was placed in the abdomen and confirmed to be   in position with saline drop test.  Pneumoperitoneum was then created.  Trocars   were then placed followed by the laparoscope.  Several operating trocars were   placed under visualization with laparoscope.  The patient was then placed in   Trendelenburg position and rotated laterally.  The appendix was taken off of the   pelvic sidewall and the abdominal sidewall with blunt dissection.  It was   grasped by its mesentery.  A window was then made in the mesentery at the base   of the appendix.  The laparoscopic stapler was then fired across the base of the   appendix with the GI load.  The stapler was then fired across the mesoappendix   with the vascular load.  The appendix was removed from the abdominal cavity with   an EndoCatch bag.  The staple lines were inspected.  There was some bleeding   from the staple lines that was controlled with a Hemoclip.   The area was   thoroughly irrigated and this was suctioned out.  The pneumoperitoneum was then   evacuated.  The trocars were then removed.  Prior to removal of the 12-mm port   in left lower quadrant, it was closed with Reji-Justin device at the fascial   level.  The skin at the trocar sites was closed with Monocryl and Dermabond.    The patient tolerated procedure and transferred to Recovery in stable condition.      ELIO/ANASTACIA  dd: 01/31/2017 08:42:14 (CST)  td: 01/31/2017 09:32:36 (CST)  Doc ID   #[id number]  Job ID #[id number]    CC:

## 2017-02-01 VITALS
SYSTOLIC BLOOD PRESSURE: 126 MMHG | DIASTOLIC BLOOD PRESSURE: 75 MMHG | RESPIRATION RATE: 16 BRPM | WEIGHT: 197 LBS | TEMPERATURE: 98 F | BODY MASS INDEX: 32.82 KG/M2 | HEIGHT: 65 IN | HEART RATE: 87 BPM | OXYGEN SATURATION: 98 %

## 2017-02-04 LAB
BACTERIA BLD CULT: NORMAL
BACTERIA BLD CULT: NORMAL

## 2017-09-12 PROBLEM — Z00.00 HEALTHCARE MAINTENANCE: Status: ACTIVE | Noted: 2017-09-12

## 2017-09-30 ENCOUNTER — HOSPITAL ENCOUNTER (EMERGENCY)
Facility: HOSPITAL | Age: 42
Discharge: HOME | End: 2017-09-30
Attending: SURGERY
Payer: MEDICAID

## 2017-09-30 VITALS
OXYGEN SATURATION: 100 % | DIASTOLIC BLOOD PRESSURE: 87 MMHG | HEIGHT: 65 IN | TEMPERATURE: 97 F | BODY MASS INDEX: 37.15 KG/M2 | HEART RATE: 77 BPM | RESPIRATION RATE: 16 BRPM | WEIGHT: 223 LBS | SYSTOLIC BLOOD PRESSURE: 143 MMHG

## 2017-09-30 DIAGNOSIS — S01.01XA SCALP LACERATION, INITIAL ENCOUNTER: Primary | ICD-10-CM

## 2017-09-30 PROCEDURE — 90471 IMMUNIZATION ADMIN: CPT | Performed by: SURGERY

## 2017-09-30 PROCEDURE — 99283 EMERGENCY DEPT VISIT LOW MDM: CPT | Mod: 25

## 2017-09-30 PROCEDURE — 25000003 PHARM REV CODE 250: Performed by: SURGERY

## 2017-09-30 PROCEDURE — 12002 RPR S/N/AX/GEN/TRNK2.6-7.5CM: CPT

## 2017-09-30 PROCEDURE — 63600175 PHARM REV CODE 636 W HCPCS: Performed by: SURGERY

## 2017-09-30 PROCEDURE — 90715 TDAP VACCINE 7 YRS/> IM: CPT | Performed by: SURGERY

## 2017-09-30 RX ORDER — MUPIROCIN 20 MG/G
OINTMENT TOPICAL 3 TIMES DAILY
Qty: 15 G | Refills: 0 | Status: SHIPPED | OUTPATIENT
Start: 2017-09-30 | End: 2017-10-10

## 2017-09-30 RX ORDER — KETOROLAC TROMETHAMINE 10 MG/1
10 TABLET, FILM COATED ORAL EVERY 6 HOURS PRN
Qty: 15 TABLET | Refills: 0 | Status: SHIPPED | OUTPATIENT
Start: 2017-09-30 | End: 2019-09-16

## 2017-09-30 RX ORDER — CEPHALEXIN 500 MG/1
500 CAPSULE ORAL EVERY 6 HOURS
Qty: 28 CAPSULE | Refills: 0 | Status: SHIPPED | OUTPATIENT
Start: 2017-09-30 | End: 2017-10-07

## 2017-09-30 RX ORDER — LIDOCAINE HYDROCHLORIDE 10 MG/ML
10 INJECTION, SOLUTION EPIDURAL; INFILTRATION; INTRACAUDAL; PERINEURAL
Status: COMPLETED | OUTPATIENT
Start: 2017-09-30 | End: 2017-09-30

## 2017-09-30 RX ADMIN — LIDOCAINE HYDROCHLORIDE 100 MG: 10 INJECTION, SOLUTION EPIDURAL; INFILTRATION; INTRACAUDAL; PERINEURAL at 11:09

## 2017-09-30 RX ADMIN — CLOSTRIDIUM TETANI TOXOID ANTIGEN (FORMALDEHYDE INACTIVATED), CORYNEBACTERIUM DIPHTHERIAE TOXOID ANTIGEN (FORMALDEHYDE INACTIVATED), BORDETELLA PERTUSSIS TOXOID ANTIGEN (GLUTARALDEHYDE INACTIVATED), BORDETELLA PERTUSSIS FILAMENTOUS HEMAGGLUTININ ANTIGEN (FORMALDEHYDE INACTIVATED), BORDETELLA PERTUSSIS PERTACTIN ANTIGEN, AND BORDETELLA PERTUSSIS FIMBRIAE 2/3 ANTIGEN 0.5 ML: 5; 2; 2.5; 5; 3; 5 INJECTION, SUSPENSION INTRAMUSCULAR at 10:09

## 2017-09-30 NOTE — ED TRIAGE NOTES
42 y.o. female presents to ER ED 01/ED 01A   Chief Complaint   Patient presents with    Head Laceration     3 cm laceration to the top of the head after hitting it on a Lift [place]. Not bleeding at this time. No LOC   . Provider is at the bedside stapling laceration.

## 2017-09-30 NOTE — ED PROVIDER NOTES
[facility]                                     2017                   Chief Complaint  42 y.o. female with Head Laceration (laceration to the top of the head after hitting it on a Lift [place])    History of Present Illness  Riddhi Frederick presents to the emergency room with a head laceration today  Patient was accidentally hit with the lift gate of a truck, no loss of consciousness  Patient on exam has a small superficial 6 cm scalp laceration: Top of the scalp  Patient is alert and oriented ×3 with a GCS of 15 and normal motor exam now  No signs of concussion or closed head injury, will require wound closure today    The history is provided by the patient     Past Medical History   -- Anxiety     -- Hypertension     -- Obesity, Class II, BMI 35-39.9     -- Panic attacks     -- Uterine leiomyoma        Past Surgical History   --  section       -- Knee cartilage surgery       -- Tubal ligation       -- Cholecystectomy       -- Ercp       -- Hysterectomy          ALLERGIES: ACE inhibitor     Review of Systems and Physical Exam     Review of Systems  -- Constitution - no fever, denies fatigue, no weakness, no chills  -- Eyes - no tearing or redness, no visual disturbance  -- Ear, Nose - no tinnitus or earache, no nasal congestion or discharge  -- Mouth,Throat - no sore throat, no toothache, normal voice, normal swallowing  -- Respiratory - denies cough and congestion, no shortness of breath, no CALDERON  -- Cardiovascular - denies chest pain, no palpitations, denies claudication  -- Gastrointestinal - denies abdominal pain, nausea, vomiting, or diarrhea  -- Musculoskeletal - denies back pain, negative for myalgias and arthralgias   -- Neurological - no headache, denies weakness or seizure; no LOC  -- Skin - scalp laceration    Vital Signs  -- Her oral temperature is 96.9 °F (36.1 °C).   -- Her blood pressure is 143/87 and her pulse is 77.   -- Her respiration is 16 and oxygen  saturation is 100%.      Physical Exam  -- Nursing note and vitals reviewed  -- Constitutional: Appears well-developed and well-nourished  -- Head: Atraumatic. Normocephalic. No obvious abnormality  -- Eyes: Pupils are equal and reactive to light. Normal conjunctiva and lids  -- Nose: Nose normal in appearance, nares grossly normal. No discharge  -- Throat: Mucous membranes moist, pharynx normal, normal tonsils. No lesions   -- Ears: External ears and TM normal bilaterally. Normal hearing and no drainage  -- Neck: Normal range of motion. Neck supple. No masses, trachea midline  -- Cardiac: Normal rate, regular rhythm and normal heart sounds  -- Pulmonary: Normal respiratory effort, breath sounds clear to auscultation  -- Abdominal: Soft, no tenderness. Normal bowel sounds. Normal liver edge  -- Musculoskeletal: Normal range of motion, no effusions. Joints stable   -- Neurological: No focal deficits. Showed good interaction with staff  -- Vascular: Posterior tibial, dorsalis pedis and radial pulses 2+ bilaterally    -- Lymphatics: No cervical or peripheral lymphadenopathy. No edema noted  -- Skin: 5 cm scalp laceration, the level of the dermis    Emergency Room Course     Laceration Repair  -- Performed by: TILA MEHTA  -- Consent Done: Emergent Situation  -- Body area: Scalp  -- Laceration length: 5 cm  -- Tendon involvement: none  -- Nerve involvement: none  -- Vascular damage: no  -- Anesthesia: local infiltration  -- Local anesthetic: lidocaine 1%   -- Anesthetic total: 10 ml  -- Irrigation solution: saline  -- Amount of cleaning: standard  -- Skin closure: 4-0 nylon  -- Number of sutures:   -- Approximation difficulty: simple  -- Dressing: antibiotic ointment    Medications Given  -- lidocaine (PF) 10 mg/ml (1%) injection 100 mg (100 mg Infiltration Given by Other 9/30/17 1101)   -- Tdap vaccine injection 0.5 mL (0.5 mLs Intramuscular Given 9/30/17 9944)     Diagnosis  -- The encounter diagnosis was Scalp  laceration, initial encounter.    Disposition and Plan  -- Disposition: home  -- Condition: stable  -- Follow-up: Patient to follow up with Nghia Carmen MD in 1-2 days.  -- I advised the patient that we have found no life threatening condition today  -- At this time, I believe the patient is clinically stable for discharge.   -- The patient acknowledges that close follow up with a MD is required   -- Patient agrees to comply with all instruction and direction given in the ER    This note is dictated on Dragon Natural Speaking word recognition program.  There are word recognition mistakes that are occasionally missed on review.           Alcides Sparks MD  09/30/17 5846

## 2017-10-11 ENCOUNTER — HOSPITAL ENCOUNTER (EMERGENCY)
Facility: HOSPITAL | Age: 42
Discharge: HOME | End: 2017-10-11
Attending: SURGERY
Payer: MEDICAID

## 2017-10-11 VITALS
DIASTOLIC BLOOD PRESSURE: 98 MMHG | BODY MASS INDEX: 37.99 KG/M2 | SYSTOLIC BLOOD PRESSURE: 148 MMHG | HEART RATE: 80 BPM | WEIGHT: 228 LBS | HEIGHT: 65 IN | TEMPERATURE: 98 F

## 2017-10-11 DIAGNOSIS — Z48.02 ENCOUNTER FOR STAPLE REMOVAL: Primary | ICD-10-CM

## 2017-10-11 PROCEDURE — 99281 EMR DPT VST MAYX REQ PHY/QHP: CPT

## 2017-10-11 NOTE — ED PROVIDER NOTES
[facility]                                     2017                   Chief Complaint  42 y.o. female with Staple Removal     History of Present Illness  Riddhi Frederick presents to the emergency room for staple removal today  Patient had a scalp laceration stapled in this ER 10 days ago, healed well  Patient states she does not have a PCP to see to get the staples removed  Patient on exam has a healed scalp laceration, all staples are clean and dry   All staples removed without complication, patient has no signs of concussion    The history is provided by the patient     Past Medical History   -- Anxiety     -- Hypertension     -- Obesity, Class II, BMI 35-39.9     -- Panic attacks     -- Uterine leiomyoma        Past Surgical History   --  section       -- Knee cartilage surgery       -- Tubal ligation       -- Cholecystectomy       -- Ercp       -- Hysterectomy          ALLERGIES: ACE inhibitor     Review of Systems and Physical Exam     Review of Systems  -- Constitution - no fever, denies fatigue, no weakness, no chills  -- Eyes - no tearing or redness, no visual disturbance  -- Ear, Nose - no tinnitus or earache, no nasal congestion or discharge  -- Mouth,Throat - no sore throat, no toothache, normal voice, normal swallowing  -- Respiratory - denies cough and congestion, no shortness of breath, no CALDERON  -- Cardiovascular - denies chest pain, no palpitations, denies claudication  -- Gastrointestinal - denies abdominal pain, nausea, vomiting, or diarrhea  -- Musculoskeletal - denies back pain, negative for myalgias and arthralgias   -- Neurological - no headache, denies weakness or seizure; no LOC  -- Skin - denies pallor, rash, or changes in skin. no hives or welts noted    Vital Signs  -- Her oral temperature is 98.2 °F (36.8 °C).   -- Her blood pressure is 148/98 and her pulse is 80.      Physical Exam  -- Nursing note and vitals reviewed  -- Constitutional: Appears  well-developed and well-nourished  -- Head: Healed stapled wound of the scalp  -- Eyes: Pupils are equal and reactive to light. Normal conjunctiva and lids  -- Cardiac: Normal rate, regular rhythm and normal heart sounds  -- Pulmonary: Normal respiratory effort, breath sounds clear to auscultation  -- Abdominal: Soft, no tenderness. Normal bowel sounds. Normal liver edge  -- Musculoskeletal: Normal range of motion, no effusions. Joints stable   -- Neurological: No focal deficits. Showed good interaction with staff  -- Vascular: Posterior tibial, dorsalis pedis and radial pulses 2+ bilaterally      Emergency Room Course     Treatment and Evaluation  -- All staples were removed from the scalp without difficulty  -- No complications were noted from the procedure  -- The patient tolerated the procedure well     Diagnosis  -- The encounter diagnosis was Encounter for staple removal.    Disposition and Plan  -- Disposition: home  -- Condition: stable  -- Follow-up: Patient to follow up with Nghia Carmen MD in 1-2 days.  -- I advised the patient that we have found no life threatening condition today  -- At this time, I believe the patient is clinically stable for discharge.   -- The patient acknowledges that close follow up with a MD is required   -- Patient agrees to comply with all instruction and direction given in the ER    This note is dictated on Dragon Natural Speaking word recognition program.  There are word recognition mistakes that are occasionally missed on review.             Alcides Sparks MD  10/11/17 0963

## 2017-12-18 PROBLEM — Z00.00 HEALTHCARE MAINTENANCE: Status: RESOLVED | Noted: 2017-09-12 | Resolved: 2017-12-18

## 2018-08-04 ENCOUNTER — HOSPITAL ENCOUNTER (EMERGENCY)
Facility: HOSPITAL | Age: 43
Discharge: HOME | End: 2018-08-04
Attending: SURGERY
Payer: MEDICAID

## 2018-08-04 VITALS
BODY MASS INDEX: 36.65 KG/M2 | SYSTOLIC BLOOD PRESSURE: 134 MMHG | OXYGEN SATURATION: 99 % | RESPIRATION RATE: 16 BRPM | TEMPERATURE: 97 F | HEIGHT: 65 IN | DIASTOLIC BLOOD PRESSURE: 82 MMHG | HEART RATE: 71 BPM | WEIGHT: 220 LBS

## 2018-08-04 DIAGNOSIS — F10.10 ALCOHOL ABUSE: Primary | ICD-10-CM

## 2018-08-04 DIAGNOSIS — S01.91XA LACERATION OF HEAD WITHOUT FOREIGN BODY, UNSPECIFIED PART OF HEAD, INITIAL ENCOUNTER: ICD-10-CM

## 2018-08-04 DIAGNOSIS — M79.631 RIGHT FOREARM PAIN: ICD-10-CM

## 2018-08-04 PROCEDURE — 99284 EMERGENCY DEPT VISIT MOD MDM: CPT | Mod: 25

## 2018-08-04 PROCEDURE — 12013 RPR F/E/E/N/L/M 2.6-5.0 CM: CPT | Mod: RT

## 2018-08-05 NOTE — ED TRIAGE NOTES
Patient reports she was drinking beer and fell down some pool stairs. C/O right head lac and right forearm contusion.

## 2018-08-05 NOTE — ED PROVIDER NOTES
[facility]                                                 Chief Complaint  43 y.o. female with Laceration (to right side forehead. )    History of Present Illness  Riddhi Frederick presents to the emergency room after a fall tonight  Patient was drinking alcohol all day and fell and hit her head at home  Patient denies any loss of consciousness, small forehead laceration  Patient is alert and appropriate, obviously intoxicated on interview  Patient also states her right forearm hurts from the fall, no abrasion  Head CT was negative this evening, right forearm x-ray was negative    The history is provided by the patient   device was not used during this ER visit    Past Medical History   -- Anxiety     -- Hypertension     -- Obesity, Class II, BMI 35-39.9     -- Panic attacks     -- Uterine leiomyoma        Past Surgical History   --  section       -- Knee cartilage surgery       -- Tubal ligation       -- Cholecystectomy       -- Ercp       -- Hysterectomy          ALLERGIES: ACE inhibitor     Review of Systems and Physical Exam      Review of Systems  -- Constitution - no fever, denies fatigue, no weakness, no chills  -- Eyes - no tearing or redness, no visual disturbance  -- Ear, Nose - no tinnitus or earache, no nasal congestion or discharge  -- Mouth,Throat - no sore throat, no toothache, normal voice, normal swallowing  -- Respiratory - denies cough and congestion, no shortness of breath, no CALDERON  -- Cardiovascular - denies chest pain, no palpitations, denies claudication  -- Gastrointestinal - denies abdominal pain, nausea, vomiting, or diarrhea  -- Genitourinary - no dysuria, denies flank pain, no hematuria, no STD risk  -- Musculoskeletal - right forearm pain, negative for myalgias and arthralgias   -- Neurological - headache, denies weakness or seizure; no LOC  -- Skin - right forehead laceration     BP (!) 154/90  Pulse 79   Temp 96.6 °F (35.9 °C) (Oral)    Resp 18    Physical Exam  -- Nursing note and vitals reviewed  -- Constitutional: Appears well-developed and well-nourished  -- Head:  3 centimeter right forehead laceration at the hairline  -- Eyes: Pupils are equal and reactive to light. Normal conjunctiva and lids  -- Nose: Nose normal in appearance, nares grossly normal. No discharge  -- Throat: Mucous membranes moist, pharynx normal, normal tonsils. No lesions   -- Ears: External ears and TM normal bilaterally. Normal hearing and no drainage  -- Neck: Normal range of motion. Neck supple. No masses, trachea midline  -- Cardiac: Normal rate, regular rhythm and normal heart sounds  -- Pulmonary: Normal respiratory effort, breath sounds clear to auscultation  -- Abdominal: Soft, no tenderness. Normal bowel sounds. Normal liver edge  -- Musculoskeletal: Normal range of motion, no effusions. Joints stable   -- Neurological: No focal deficits. Showed good interaction with staff  -- Vascular: Posterior tibial, dorsalis pedis and radial pulses 2+ bilaterally      Emergency Room Course      Laceration Repair  -- Performed by: TILA MEHTA  -- Consent Done: Emergent Situation  -- Body area:  Right forehead  -- Laceration length: 3 centimeter  -- Tendon involvement: none  -- Nerve involvement: none  -- Vascular damage: no  -- Irrigation solution: saline  -- Amount of cleaning: standard  -- Skin closure: Dermabond    Radiology  -- The CT of the head performed in the ER today was negative for acute pathology  -- Preliminary ER forearm x-ray readings showed no evidence of fracture   -- All x-rays are reviewed with a final disposition given by the radiologist     Diagnosis  -- Alcohol abuse  -- Right forearm pain   -- Laceration of head     Disposition and Plan  -- Disposition: home  -- Condition: stable  -- Follow-up: Patient to follow up with Nghia Carmen MD in 1-2 days.  -- I advised the patient that we have found no life threatening condition today  -- At this  time, I believe the patient is clinically stable for discharge.   -- The patient acknowledges that close follow up with a MD is required   -- Patient agrees to comply with all instruction and direction given in the ER    This note is dictated on Dragon Natural Speaking word recognition program.  There are word recognition mistakes that are occasionally missed on review.            Alcides Sparks MD  08/04/18 5883

## 2019-06-17 ENCOUNTER — PATIENT MESSAGE (OUTPATIENT)
Dept: OBSTETRICS AND GYNECOLOGY | Facility: CLINIC | Age: 44
End: 2019-06-17

## 2021-08-17 ENCOUNTER — PATIENT OUTREACH (OUTPATIENT)
Dept: ADMINISTRATIVE | Facility: HOSPITAL | Age: 46
End: 2021-08-17

## 2021-11-15 ENCOUNTER — PATIENT OUTREACH (OUTPATIENT)
Dept: ADMINISTRATIVE | Facility: HOSPITAL | Age: 46
End: 2021-11-15

## 2022-01-28 ENCOUNTER — HOSPITAL ENCOUNTER (EMERGENCY)
Facility: HOSPITAL | Age: 47
Discharge: HOME | End: 2022-01-28
Attending: STUDENT IN AN ORGANIZED HEALTH CARE EDUCATION/TRAINING PROGRAM

## 2022-01-28 VITALS
WEIGHT: 220.25 LBS | OXYGEN SATURATION: 99 % | BODY MASS INDEX: 36.65 KG/M2 | TEMPERATURE: 98 F | SYSTOLIC BLOOD PRESSURE: 158 MMHG | DIASTOLIC BLOOD PRESSURE: 104 MMHG | HEART RATE: 68 BPM | RESPIRATION RATE: 18 BRPM

## 2022-01-28 DIAGNOSIS — I10 HYPERTENSION, UNSPECIFIED TYPE: ICD-10-CM

## 2022-01-28 DIAGNOSIS — R42 LIGHT-HEADED: Primary | ICD-10-CM

## 2022-01-28 LAB
ANION GAP SERPL CALC-SCNC: 12 MMOL/L (ref 8–16)
BASOPHILS # BLD AUTO: 0.06 K/UL (ref 0–0.2)
BASOPHILS NFR BLD: 0.9 % (ref 0–1.9)
BUN SERPL-MCNC: 15 MG/DL (ref 6–20)
CALCIUM SERPL-MCNC: 10 MG/DL (ref 8.7–10.5)
CHLORIDE SERPL-SCNC: 105 MMOL/L (ref 95–110)
CO2 SERPL-SCNC: 23 MMOL/L (ref 23–29)
CREAT SERPL-MCNC: 0.7 MG/DL (ref 0.5–1.4)
DIFFERENTIAL METHOD: ABNORMAL
EOSINOPHIL # BLD AUTO: 0.1 K/UL (ref 0–0.5)
EOSINOPHIL NFR BLD: 1.3 % (ref 0–8)
ERYTHROCYTE [DISTWIDTH] IN BLOOD BY AUTOMATED COUNT: 12.7 % (ref 11.5–14.5)
EST. GFR  (AFRICAN AMERICAN): >60 ML/MIN/1.73 M^2
EST. GFR  (NON AFRICAN AMERICAN): >60 ML/MIN/1.73 M^2
GLUCOSE SERPL-MCNC: 88 MG/DL (ref 70–110)
HCT VFR BLD AUTO: 46.1 % (ref 37–48.5)
HGB BLD-MCNC: 15.1 G/DL (ref 12–16)
IMM GRANULOCYTES # BLD AUTO: 0.01 K/UL (ref 0–0.04)
IMM GRANULOCYTES NFR BLD AUTO: 0.1 % (ref 0–0.5)
LYMPHOCYTES # BLD AUTO: 2.1 K/UL (ref 1–4.8)
LYMPHOCYTES NFR BLD: 31.4 % (ref 18–48)
MCH RBC QN AUTO: 31.3 PG (ref 27–31)
MCHC RBC AUTO-ENTMCNC: 32.8 G/DL (ref 32–36)
MCV RBC AUTO: 96 FL (ref 82–98)
MONOCYTES # BLD AUTO: 0.5 K/UL (ref 0.3–1)
MONOCYTES NFR BLD: 7.9 % (ref 4–15)
NEUTROPHILS # BLD AUTO: 3.9 K/UL (ref 1.8–7.7)
NEUTROPHILS NFR BLD: 58.4 % (ref 38–73)
NRBC BLD-RTO: 0 /100 WBC
PLATELET # BLD AUTO: 357 K/UL (ref 150–450)
PMV BLD AUTO: 9.1 FL (ref 9.2–12.9)
POTASSIUM SERPL-SCNC: 4.2 MMOL/L (ref 3.5–5.1)
RBC # BLD AUTO: 4.82 M/UL (ref 4–5.4)
SARS-COV-2 RNA RESP QL NAA+PROBE: NOT DETECTED
SARS-COV-2- CYCLE NUMBER: NORMAL
SODIUM SERPL-SCNC: 140 MMOL/L (ref 136–145)
WBC # BLD AUTO: 6.73 K/UL (ref 3.9–12.7)

## 2022-01-28 PROCEDURE — 93010 ELECTROCARDIOGRAM REPORT: CPT | Mod: ,,, | Performed by: INTERNAL MEDICINE

## 2022-01-28 PROCEDURE — 93005 ELECTROCARDIOGRAM TRACING: CPT

## 2022-01-28 PROCEDURE — 85025 COMPLETE CBC W/AUTO DIFF WBC: CPT | Performed by: STUDENT IN AN ORGANIZED HEALTH CARE EDUCATION/TRAINING PROGRAM

## 2022-01-28 PROCEDURE — 36415 COLL VENOUS BLD VENIPUNCTURE: CPT | Performed by: STUDENT IN AN ORGANIZED HEALTH CARE EDUCATION/TRAINING PROGRAM

## 2022-01-28 PROCEDURE — U0005 INFEC AGEN DETEC AMPLI PROBE: HCPCS | Performed by: STUDENT IN AN ORGANIZED HEALTH CARE EDUCATION/TRAINING PROGRAM

## 2022-01-28 PROCEDURE — 80048 BASIC METABOLIC PNL TOTAL CA: CPT | Performed by: STUDENT IN AN ORGANIZED HEALTH CARE EDUCATION/TRAINING PROGRAM

## 2022-01-28 PROCEDURE — 99284 EMERGENCY DEPT VISIT MOD MDM: CPT | Mod: 25

## 2022-01-28 PROCEDURE — U0003 INFECTIOUS AGENT DETECTION BY NUCLEIC ACID (DNA OR RNA); SEVERE ACUTE RESPIRATORY SYNDROME CORONAVIRUS 2 (SARS-COV-2) (CORONAVIRUS DISEASE [COVID-19]), AMPLIFIED PROBE TECHNIQUE, MAKING USE OF HIGH THROUGHPUT TECHNOLOGIES AS DESCRIBED BY CMS-2020-01-R: HCPCS | Performed by: STUDENT IN AN ORGANIZED HEALTH CARE EDUCATION/TRAINING PROGRAM

## 2022-01-28 PROCEDURE — 25000003 PHARM REV CODE 250: Performed by: STUDENT IN AN ORGANIZED HEALTH CARE EDUCATION/TRAINING PROGRAM

## 2022-01-28 PROCEDURE — 93010 EKG 12-LEAD: ICD-10-PCS | Mod: ,,, | Performed by: INTERNAL MEDICINE

## 2022-01-28 RX ORDER — CLONIDINE HYDROCHLORIDE 0.1 MG/1
0.1 TABLET ORAL
Status: COMPLETED | OUTPATIENT
Start: 2022-01-28 | End: 2022-01-28

## 2022-01-28 RX ADMIN — CLONIDINE HYDROCHLORIDE 0.1 MG: 0.1 TABLET ORAL at 11:01

## 2022-01-28 NOTE — ED PROVIDER NOTES
Encounter Date: 2022    This document was partially completed using speech recognition software and may contain misspellings, grammatical errors, and/or unexpected word substitutions.       History     Chief Complaint   Patient presents with    General Illness     C/o feeling light headed since yesterday. C/o nausea and headache today. Patient reports kat had COVID 2 weeks ago.     47-year-old female presents to the emergency department with lightheadedness since yesterday.  She is also having some nausea and a mild headache as well.  Her first grandson tested positive for COVID19 2 weeks ago.    Lightheadedness happens randomly - not associated with change in position, standing, lying down, exertion.    Her BP was running high as well - denies any chest pain, numbness, weakness, slurred speech, vision changes. Has been off her BP meds for a year since her HTN was controlled after she lost weight.         Review of patient's allergies indicates:   Allergen Reactions    Ace inhibitors Other (See Comments)     Blood pressure dropped too low     Past Medical History:   Diagnosis Date    Anxiety     Hypertension     from pre-eclampsia     Obesity, Class II, BMI 35-39.9 2016    Panic attacks 2016    Takes Xanax prn    Uterine leiomyoma      Past Surgical History:   Procedure Laterality Date    APPENDECTOMY       SECTION      CHOLECYSTECTOMY      ERCP  2016    HYSTERECTOMY      KNEE CARTILAGE SURGERY Right 2012    OOPHORECTOMY      TUBAL LIGATION       Family History   Problem Relation Age of Onset    Cancer Mother         breast    Cancer Father         lung     Social History     Tobacco Use    Smoking status: Former Smoker     Packs/day: 1.00     Years: 15.00     Pack years: 15.00     Types: Cigarettes     Quit date: 2015     Years since quittin.4    Smokeless tobacco: Never Used   Substance Use Topics    Alcohol use: Yes     Alcohol/week: 0.0 standard drinks      Comment: socially    Drug use: No     Review of Systems   Constitutional: Negative for chills and fever.   HENT: Negative for congestion, rhinorrhea and sneezing.    Eyes: Negative for discharge and redness.   Respiratory: Negative for cough and shortness of breath.    Cardiovascular: Negative for chest pain and palpitations.   Gastrointestinal: Positive for nausea. Negative for abdominal pain, diarrhea and vomiting.   Genitourinary: Negative for dysuria, frequency, vaginal bleeding and vaginal discharge.   Musculoskeletal: Negative for back pain and neck pain.   Skin: Negative for rash and wound.   Neurological: Positive for light-headedness and headaches. Negative for dizziness, weakness and numbness.       Physical Exam     Initial Vitals [01/28/22 1040]   BP Pulse Resp Temp SpO2   (!) 226/111 78 18 97.8 °F (36.6 °C) 100 %      MAP       --         Physical Exam    Nursing note and vitals reviewed.  Constitutional: She appears well-developed. She is not diaphoretic. No distress.   HENT:   Head: Normocephalic and atraumatic.   Right Ear: External ear normal.   Left Ear: External ear normal.   Eyes: Right eye exhibits no discharge. Left eye exhibits no discharge. No scleral icterus.   Neck: Neck supple.   Cardiovascular: Normal rate and regular rhythm.   Pulmonary/Chest: Breath sounds normal. No stridor. No respiratory distress. She has no wheezes. She has no rhonchi. She has no rales.   Abdominal: Abdomen is soft. There is no abdominal tenderness. There is no guarding.   Musculoskeletal:         General: No edema.      Cervical back: Neck supple.     Neurological: She is alert and oriented to person, place, and time.   Skin: Skin is warm and dry. Capillary refill takes less than 2 seconds.   Psychiatric: She has a normal mood and affect.         ED Course   Procedures  Labs Reviewed   CBC W/ AUTO DIFFERENTIAL - Abnormal; Notable for the following components:       Result Value    MCH 31.3 (*)     MPV 9.1 (*)      All other components within normal limits   BASIC METABOLIC PANEL   SARS-COV-2 (COVID-19) QUALITATIVE PCR     EKG Readings: (Independently Interpreted)   NSR at 69 bpm. LAFB. Left axis. Normal intervals. No WILLIAM/STD.     ECG Results          EKG 12-lead (Final result)  Result time 01/28/22 11:46:47    Final result by Interface, Lab In [facility] (01/28/22 11:46:47)                 Narrative:    Test Reason : R42,    Vent. Rate : 069 BPM     Atrial Rate : 069 BPM     P-R Int : 152 ms          QRS Dur : 100 ms      QT Int : 414 ms       P-R-T Axes : 049 -49 046 degrees     QTc Int : 443 ms    Normal sinus rhythm  Possible Left atrial enlargement  Left anterior fascicular block  Abnormal ECG  When compared with ECG of 13-JUL-2016 07:56,  Left anterior fascicular block is now Present  T wave amplitude has decreased in Anterior leads  Confirmed by Teto Humphreys MD ([id number]) on 1/28/2022 11:46:37 AM    Referred By: [email or web address]   SELF           Confirmed By:Teto Humphreys MD                            Imaging Results    None          Medications   cloNIDine tablet 0.1 mg (0.1 mg Oral Given 1/28/22 1135)     Medical Decision Making:   Differential Diagnosis:   DDx: COVID19, HTN emergency, TIA, stroke, SAH, ACS, anemia  ED Management:  Based on the patient's evaluation, patient appears well for discharge home.  Unremarkable ED workup.  Felt better after her blood pressure was better controlled.  158/104 at time of discharge.  Blood work was benign with no evidence of JULIAN.  She is not endorsing any chest pain so low suspicion for ACS.  Also not endorsing any numbness or weakness or slurred speech or visual changes so low suspicion for stroke.    COVID19 PCR pending. Will discharge home to PCP f/u. If BP continues to be high, may need restarting of her BP meds. Return precautions given. Patient agrees with the plan.                      Clinical Impression:   Final diagnoses:  [R42] Light-headed (Primary)  [I10] Hypertension,  unspecified type          ED Disposition Condition    Discharge Stable        ED Prescriptions     None        Follow-up Information     Follow up With Specialties Details Why Contact Info    Nghia Carmen Jr., MD Internal Medicine Schedule an appointment as soon as possible for a visit in 1 week  [address] [facility]  [place] [address]  [phone]             Jerrod Schuster DO  01/28/22 1331

## 2022-01-28 NOTE — DISCHARGE INSTRUCTIONS
Return to the ED if you have worsening lightheadedness, passing out, chest pain, shortness of breath, headaches, numbness, weakness.

## 2022-01-28 NOTE — ED NOTES
"Patient reports "my doctor took me off of my high blood pressure medications after I lost weight about 1 year ago."   "

## 2022-02-03 PROBLEM — R42 LIGHT HEADED: Status: ACTIVE | Noted: 2022-02-03

## 2022-06-17 ENCOUNTER — LAB VISIT (OUTPATIENT)
Dept: LAB | Facility: HOSPITAL | Age: 47
End: 2022-06-17
Attending: INTERNAL MEDICINE

## 2022-06-17 DIAGNOSIS — Z00.00 HEALTHCARE MAINTENANCE: ICD-10-CM

## 2022-06-17 PROCEDURE — 82274 ASSAY TEST FOR BLOOD FECAL: CPT | Performed by: INTERNAL MEDICINE

## 2022-06-28 LAB — HEMOCCULT STL QL IA: NEGATIVE

## 2023-01-18 DIAGNOSIS — Z12.31 OTHER SCREENING MAMMOGRAM: ICD-10-CM

## 2023-12-18 ENCOUNTER — PATIENT MESSAGE (OUTPATIENT)
Dept: ADMINISTRATIVE | Facility: HOSPITAL | Age: 48
End: 2023-12-18

## (undated) DEVICE — NDL 18GA

## (undated) DEVICE — DURAPREP SURG SCRUB 26ML

## (undated) DEVICE — GLOVE BIOGEL 7.5

## (undated) DEVICE — SOL WATER STRL IRR 1000ML

## (undated) DEVICE — STAPLER INT LINEAR ARTC 3.5-45

## (undated) DEVICE — NDL HYPO REG 25G X 1 1/2

## (undated) DEVICE — APPLIER CLIP ENDO MED/LG 10MM

## (undated) DEVICE — SOL CLEARIFY VISUALIZATION LAP

## (undated) DEVICE — CART STAPLE RELD 45MM WHT

## (undated) DEVICE — DEVICE CLOSURE DISP 14G

## (undated) DEVICE — PACK GENERAL SURGERY

## (undated) DEVICE — LINER GLOVE POWDERFREE 8

## (undated) DEVICE — PAD UNDERPAD 30X30

## (undated) DEVICE — SYR 10CC LUER LOCK

## (undated) DEVICE — BAG TISS RETRV MONARCH 10MM

## (undated) DEVICE — IRRIGATOR ENDOSCOPY DISP.

## (undated) DEVICE — CART STAPLE FLEX ETX 3.5MM BLU